# Patient Record
Sex: MALE | Race: WHITE | NOT HISPANIC OR LATINO | URBAN - METROPOLITAN AREA
[De-identification: names, ages, dates, MRNs, and addresses within clinical notes are randomized per-mention and may not be internally consistent; named-entity substitution may affect disease eponyms.]

---

## 2024-11-19 ENCOUNTER — OFFICE VISIT (OUTPATIENT)
Dept: FAMILY MEDICINE CLINIC | Facility: CLINIC | Age: 39
End: 2024-11-19
Payer: MEDICAID

## 2024-11-19 ENCOUNTER — LAB (OUTPATIENT)
Dept: LAB | Facility: HOSPITAL | Age: 39
End: 2024-11-19
Payer: MEDICAID

## 2024-11-19 VITALS
HEIGHT: 67 IN | DIASTOLIC BLOOD PRESSURE: 70 MMHG | WEIGHT: 148.4 LBS | OXYGEN SATURATION: 99 % | BODY MASS INDEX: 23.29 KG/M2 | TEMPERATURE: 98.4 F | SYSTOLIC BLOOD PRESSURE: 112 MMHG | HEART RATE: 66 BPM

## 2024-11-19 DIAGNOSIS — Z00.00 ANNUAL PHYSICAL EXAM: Primary | ICD-10-CM

## 2024-11-19 DIAGNOSIS — Z00.00 ANNUAL PHYSICAL EXAM: ICD-10-CM

## 2024-11-19 LAB
ALBUMIN SERPL-MCNC: 4.2 G/DL (ref 3.5–5.2)
ALBUMIN/GLOB SERPL: 1.5 G/DL
ALP SERPL-CCNC: 60 U/L (ref 39–117)
ALT SERPL W P-5'-P-CCNC: 17 U/L (ref 1–41)
ANION GAP SERPL CALCULATED.3IONS-SCNC: 10 MMOL/L (ref 5–15)
AST SERPL-CCNC: 20 U/L (ref 1–40)
BASOPHILS # BLD MANUAL: 0.04 10*3/MM3 (ref 0–0.2)
BASOPHILS NFR BLD MANUAL: 1 % (ref 0–1.5)
BILIRUB SERPL-MCNC: 0.6 MG/DL (ref 0–1.2)
BUN SERPL-MCNC: 16 MG/DL (ref 6–20)
BUN/CREAT SERPL: 18.2 (ref 7–25)
CALCIUM SPEC-SCNC: 9.3 MG/DL (ref 8.6–10.5)
CHLORIDE SERPL-SCNC: 104 MMOL/L (ref 98–107)
CHOLEST SERPL-MCNC: 160 MG/DL (ref 0–200)
CO2 SERPL-SCNC: 26 MMOL/L (ref 22–29)
CREAT SERPL-MCNC: 0.88 MG/DL (ref 0.76–1.27)
DEPRECATED RDW RBC AUTO: 41.2 FL (ref 37–54)
EGFRCR SERPLBLD CKD-EPI 2021: 112.9 ML/MIN/1.73
EOSINOPHIL # BLD MANUAL: 0.09 10*3/MM3 (ref 0–0.4)
EOSINOPHIL NFR BLD MANUAL: 2 % (ref 0.3–6.2)
ERYTHROCYTE [DISTWIDTH] IN BLOOD BY AUTOMATED COUNT: 12.4 % (ref 12.3–15.4)
GLOBULIN UR ELPH-MCNC: 2.8 GM/DL
GLUCOSE SERPL-MCNC: 94 MG/DL (ref 65–99)
HBA1C MFR BLD: 5.7 % (ref 4.8–5.6)
HCT VFR BLD AUTO: 43.4 % (ref 37.5–51)
HCV AB SER QL: NORMAL
HDLC SERPL-MCNC: 46 MG/DL (ref 40–60)
HGB BLD-MCNC: 14.8 G/DL (ref 13–17.7)
LDLC SERPL CALC-MCNC: 102 MG/DL (ref 0–100)
LDLC/HDLC SERPL: 2.23 {RATIO}
LYMPHOCYTES # BLD MANUAL: 2.12 10*3/MM3 (ref 0.7–3.1)
LYMPHOCYTES NFR BLD MANUAL: 6.1 % (ref 5–12)
MCH RBC QN AUTO: 30.8 PG (ref 26.6–33)
MCHC RBC AUTO-ENTMCNC: 34.1 G/DL (ref 31.5–35.7)
MCV RBC AUTO: 90.2 FL (ref 79–97)
MONOCYTES # BLD: 0.26 10*3/MM3 (ref 0.1–0.9)
NEUTROPHILS # BLD AUTO: 1.81 10*3/MM3 (ref 1.7–7)
NEUTROPHILS NFR BLD MANUAL: 41.8 % (ref 42.7–76)
NRBC BLD AUTO-RTO: 0 /100 WBC (ref 0–0.2)
PLAT MORPH BLD: NORMAL
PLATELET # BLD AUTO: 241 10*3/MM3 (ref 140–450)
PMV BLD AUTO: 10.2 FL (ref 6–12)
POTASSIUM SERPL-SCNC: 4.2 MMOL/L (ref 3.5–5.2)
PROT SERPL-MCNC: 7 G/DL (ref 6–8.5)
RBC # BLD AUTO: 4.81 10*6/MM3 (ref 4.14–5.8)
RBC MORPH BLD: NORMAL
SODIUM SERPL-SCNC: 140 MMOL/L (ref 136–145)
TRIGL SERPL-MCNC: 57 MG/DL (ref 0–150)
TSH SERPL DL<=0.05 MIU/L-ACNC: 2.25 UIU/ML (ref 0.27–4.2)
VARIANT LYMPHS NFR BLD MANUAL: 49 % (ref 19.6–45.3)
VLDLC SERPL-MCNC: 12 MG/DL (ref 5–40)
WBC MORPH BLD: NORMAL
WBC NRBC COR # BLD AUTO: 4.33 10*3/MM3 (ref 3.4–10.8)

## 2024-11-19 PROCEDURE — 1159F MED LIST DOCD IN RCRD: CPT | Performed by: STUDENT IN AN ORGANIZED HEALTH CARE EDUCATION/TRAINING PROGRAM

## 2024-11-19 PROCEDURE — 99395 PREV VISIT EST AGE 18-39: CPT | Performed by: STUDENT IN AN ORGANIZED HEALTH CARE EDUCATION/TRAINING PROGRAM

## 2024-11-19 PROCEDURE — 84443 ASSAY THYROID STIM HORMONE: CPT

## 2024-11-19 PROCEDURE — 1160F RVW MEDS BY RX/DR IN RCRD: CPT | Performed by: STUDENT IN AN ORGANIZED HEALTH CARE EDUCATION/TRAINING PROGRAM

## 2024-11-19 PROCEDURE — 80061 LIPID PANEL: CPT

## 2024-11-19 PROCEDURE — 1126F AMNT PAIN NOTED NONE PRSNT: CPT | Performed by: STUDENT IN AN ORGANIZED HEALTH CARE EDUCATION/TRAINING PROGRAM

## 2024-11-19 PROCEDURE — 80053 COMPREHEN METABOLIC PANEL: CPT

## 2024-11-19 PROCEDURE — 83036 HEMOGLOBIN GLYCOSYLATED A1C: CPT

## 2024-11-19 PROCEDURE — 85007 BL SMEAR W/DIFF WBC COUNT: CPT

## 2024-11-19 PROCEDURE — 86803 HEPATITIS C AB TEST: CPT

## 2024-11-19 PROCEDURE — 2014F MENTAL STATUS ASSESS: CPT | Performed by: STUDENT IN AN ORGANIZED HEALTH CARE EDUCATION/TRAINING PROGRAM

## 2024-11-19 PROCEDURE — 85025 COMPLETE CBC W/AUTO DIFF WBC: CPT

## 2024-11-19 NOTE — PROGRESS NOTES
"    Office Note     Name: Lam Humphreys    : 1985     MRN: [id number]     Chief Complaint  Establish Care    Subjective     History of Present Illness:  Lam Humphreys is a 38 y.o. male who presents today to establish care and get an annual physical.    He has no concerns today.  He is okay with getting basic labs today.    He was sick recently and needed to go to the urgent treatment clinic.  Overall, symptoms have improved.  He does still have    HM:   -Believes that he is up-to-date on his Tdap, had it right before arriving to the [facility].          Objective     History reviewed. No pertinent past medical history.  Past Surgical History:   Procedure Laterality Date    KNEE SURGERY Left     had a ligement repair     History reviewed. No pertinent family history.    Vital Signs  /70   Pulse 66   Temp 98.4 °F (36.9 °C) (Infrared)   Ht 170.2 cm (67\")   Wt 67.3 kg (148 lb 6.4 oz)   SpO2 99%   BMI 23.24 kg/m²   Estimated body mass index is 23.24 kg/m² as calculated from the following:    Height as of this encounter: 170.2 cm (67\").    Weight as of this encounter: 67.3 kg (148 lb 6.4 oz).    Physical Exam  Vitals reviewed.   Constitutional:       Appearance: Normal appearance.   HENT:      Head: Normocephalic.      Right Ear: External ear normal.      Left Ear: External ear normal.      Nose: Nose normal.      Mouth/Throat:      Mouth: Mucous membranes are moist.   Eyes:      Extraocular Movements: Extraocular movements intact.   Neck:      Comments: Enlarged lymph node on the left  Cardiovascular:      Rate and Rhythm: Normal rate and regular rhythm.      Heart sounds: Normal heart sounds.   Pulmonary:      Effort: Pulmonary effort is normal. No respiratory distress.      Breath sounds: Normal breath sounds.   Abdominal:      General: Abdomen is flat.      Palpations: Abdomen is soft.   Musculoskeletal:      Cervical back: No rigidity.      Comments: Moving all extremities spontaneously "   Skin:     General: Skin is warm and dry.   Neurological:      General: No focal deficit present.      Mental Status: He is alert and oriented to person, place, and time.   Psychiatric:         Mood and Affect: Mood normal.         Behavior: Behavior normal.                 Assessment and Plan     Diagnoses and all orders for this visit:    1. Annual physical exam (Primary)  Assessment & Plan:  - Recommend diet and exercise: At least 30 minutes of exercises per week and eating a variety of fresh fruits, vegetables, lean proteins limiting fatty food intake and carbohydrate intake  - Recommend annual dental and eye visits-additional information provided in AVS  - Obtain basic screening labs, will follow-up on results and advise further  - Patient believes that he is up-to-date on his Tdap vaccine, reports that he had a right before arriving to the [place], so within the last several months    Orders:  -     Lipid Panel; Future  -     TSH Rfx On Abnormal To Free T4; Future  -     Hepatitis C Antibody; Future  -     Hemoglobin A1c; Future  -     Comprehensive Metabolic Panel; Future  -     CBC & Differential; Future      BMI is within normal parameters. No other follow-up for BMI required.    Follow Up  No follow-ups on file.    Zoila Ding MD

## 2024-11-19 NOTE — PATIENT INSTRUCTIONS

## 2024-11-19 NOTE — ASSESSMENT & PLAN NOTE
- Recommend diet and exercise: At least 30 minutes of exercises per week and eating a variety of fresh fruits, vegetables, lean proteins limiting fatty food intake and carbohydrate intake  - Recommend annual dental and eye visits-additional information provided in AVS  - Obtain basic screening labs, will follow-up on results and advise further  - Patient believes that he is up-to-date on his Tdap vaccine, reports that he had a right before arriving to the [place], so within the last several months

## 2025-04-24 ENCOUNTER — OFFICE VISIT (OUTPATIENT)
Dept: FAMILY MEDICINE CLINIC | Facility: CLINIC | Age: 40
End: 2025-04-24
Payer: MEDICAID

## 2025-04-24 VITALS
TEMPERATURE: 98.4 F | OXYGEN SATURATION: 98 % | DIASTOLIC BLOOD PRESSURE: 62 MMHG | WEIGHT: 145 LBS | BODY MASS INDEX: 22.76 KG/M2 | HEIGHT: 67 IN | HEART RATE: 74 BPM | SYSTOLIC BLOOD PRESSURE: 108 MMHG

## 2025-04-24 DIAGNOSIS — J06.9 VIRAL UPPER RESPIRATORY INFECTION: Primary | ICD-10-CM

## 2025-04-24 DIAGNOSIS — R05.9 COUGH, UNSPECIFIED TYPE: ICD-10-CM

## 2025-04-24 DIAGNOSIS — R10.9 BILATERAL FLANK PAIN: ICD-10-CM

## 2025-04-24 LAB
BILIRUB BLD-MCNC: NEGATIVE MG/DL
CLARITY, POC: CLEAR
COLOR UR: YELLOW
EXPIRATION DATE: NORMAL
EXPIRATION DATE: NORMAL
FLUAV AG UPPER RESP QL IA.RAPID: NOT DETECTED
FLUBV AG UPPER RESP QL IA.RAPID: NOT DETECTED
GLUCOSE UR STRIP-MCNC: NEGATIVE MG/DL
INTERNAL CONTROL: NORMAL
KETONES UR QL: NEGATIVE
LEUKOCYTE EST, POC: NEGATIVE
Lab: NORMAL
Lab: NORMAL
NITRITE UR-MCNC: NEGATIVE MG/ML
PH UR: 7 [PH] (ref 5–8)
PROT UR STRIP-MCNC: NEGATIVE MG/DL
RBC # UR STRIP: NEGATIVE /UL
SARS-COV-2 AG UPPER RESP QL IA.RAPID: NOT DETECTED
SP GR UR: 1.01 (ref 1–1.03)
UROBILINOGEN UR QL: NORMAL

## 2025-04-24 PROCEDURE — 87086 URINE CULTURE/COLONY COUNT: CPT | Performed by: STUDENT IN AN ORGANIZED HEALTH CARE EDUCATION/TRAINING PROGRAM

## 2025-04-24 RX ORDER — FLUTICASONE PROPIONATE 50 MCG
2 SPRAY, SUSPENSION (ML) NASAL DAILY
COMMUNITY

## 2025-04-24 RX ORDER — AZITHROMYCIN 500 MG/1
500 TABLET, FILM COATED ORAL DAILY
Qty: 3 TABLET | Refills: 0 | Status: SHIPPED | OUTPATIENT
Start: 2025-04-24 | End: 2025-04-27

## 2025-04-24 RX ORDER — BROMPHENIRAMINE MALEATE, PSEUDOEPHEDRINE HYDROCHLORIDE, AND DEXTROMETHORPHAN HYDROBROMIDE 2; 30; 10 MG/5ML; MG/5ML; MG/5ML
5 SYRUP ORAL 4 TIMES DAILY PRN
Qty: 118 ML | Refills: 0 | Status: SHIPPED | OUTPATIENT
Start: 2025-04-24

## 2025-04-24 NOTE — PROGRESS NOTES
"    Office Note     Name: Lam Humphreys    : 1985     MRN: [id number]     Chief Complaint  Fever (Monday patient had a fever), Cough, and Back Pain    Subjective     History of Present Illness:  Lam Humphreys is a 39 y.o. male who presents today for fever, cough and back pain.    Patient reports that he has developed symptoms 6 days ago.  He did have a fever on Monday but no longer has a fever.  He remains congested and has been coughing.  He does feel that his symptoms overall have been getting better.  He is using some nasal spray from [employer].  His cough is very irritating and he can have coughing spells that can either be dry or productive.    Patient reports that he has had bilateral flank pain that started about 4 days ago.  He reports that this gets worse when he sits for prolonged period of time.  Denies any history of nephrolithiasis.  No radiation of symptoms.  He does have a history of prostatitis and is worried that that might be coming back.  He cannot really tell me the quality of his pain.        Objective     History reviewed. No pertinent past medical history.  Past Surgical History:   Procedure Laterality Date    KNEE SURGERY Left     had a ligement repair     History reviewed. No pertinent family history.    Vital Signs  /62   Pulse 74   Temp 98.4 °F (36.9 °C) (Infrared)   Ht 170.2 cm (67\")   Wt 65.8 kg (145 lb)   SpO2 98%   BMI 22.71 kg/m²   Estimated body mass index is 22.71 kg/m² as calculated from the following:    Height as of this encounter: 170.2 cm (67\").    Weight as of this encounter: 65.8 kg (145 lb).    Physical Exam  Vitals reviewed.   Constitutional:       Appearance: Normal appearance.   HENT:      Head: Normocephalic.      Right Ear: Tympanic membrane normal.      Left Ear: Tympanic membrane normal.      Nose: Congestion present.      Mouth/Throat:      Mouth: Mucous membranes are moist.   Eyes:      Conjunctiva/sclera: Conjunctivae normal.   Cardiovascular: "      Rate and Rhythm: Normal rate and regular rhythm.   Pulmonary:      Effort: Pulmonary effort is normal.      Comments: Possibly some crackles in the right upper lung lobe  Abdominal:      General: Abdomen is flat.      Palpations: Abdomen is soft.      Comments: No CVA tenderness   Musculoskeletal:      Comments: No tenderness to palpation over lumbar spine   Neurological:      Mental Status: He is alert.               Assessment and Plan     Diagnoses and all orders for this visit:    1. Viral upper respiratory infection (Primary)  -     Covid-19 + Flu A&B AG, Veritor  -     azithromycin (Zithromax) 500 MG tablet; Take 1 tablet by mouth Daily for 3 days.  Dispense: 3 tablet; Refill: 0  -     brompheniramine-pseudoephedrine-DM 30-2-10 MG/5ML syrup; Take 5 mL by mouth 4 (Four) Times a Day As Needed for Cough or Congestion.  Dispense: 118 mL; Refill: 0    2. Cough, unspecified type  -     Covid-19 + Flu A&B AG, Veritor  -     XR Chest PA & Lateral (In Office)  -     azithromycin (Zithromax) 500 MG tablet; Take 1 tablet by mouth Daily for 3 days.  Dispense: 3 tablet; Refill: 0  -     brompheniramine-pseudoephedrine-DM 30-2-10 MG/5ML syrup; Take 5 mL by mouth 4 (Four) Times a Day As Needed for Cough or Congestion.  Dispense: 118 mL; Refill: 0    3. Bilateral flank pain  -     POCT urinalysis dipstick, automated  -     Urine Culture - Urine, Urine, Clean Catch; Future  -     Urine Culture - Urine, Urine, Clean Catch    Patient likely with a viral upper respiratory infection.  Had negative COVID and flu today.  Suspect that has developed pneumonia, has had some crackles in the right upper lobe pending x-ray for visualization of this.  Will start azithromycin and may add Augmentin depending on x-ray results.  Will start Bromfed to use as needed for cough and congestion.    Patient reports bilateral flank pain, point-of-care UA negative for infection, patient is worried about prostatitis, so obtaining urine culture,  will follow-up on those results and advise further.  I do think the flank pain is likely musculoskeletal in origin and I did discuss stretching exercises.  Can use Tylenol and ibuprofen for pain control as needed.    Advised to return to clinic if no improvement or worsening of symptoms.  Follow Up  No follow-ups on file.    Zoila Ding MD

## 2025-04-25 LAB — BACTERIA SPEC AEROBE CULT: NO GROWTH

## 2025-05-24 ENCOUNTER — HOSPITAL ENCOUNTER (EMERGENCY)
Facility: HOSPITAL | Age: 40
Discharge: HOME | End: 2025-05-24
Attending: EMERGENCY MEDICINE
Payer: MEDICAID

## 2025-05-24 VITALS
BODY MASS INDEX: 22.77 KG/M2 | HEIGHT: 67 IN | DIASTOLIC BLOOD PRESSURE: 93 MMHG | WEIGHT: 145.06 LBS | TEMPERATURE: 97.9 F | HEART RATE: 60 BPM | SYSTOLIC BLOOD PRESSURE: 134 MMHG | OXYGEN SATURATION: 100 % | RESPIRATION RATE: 16 BRPM

## 2025-05-24 DIAGNOSIS — S71.152A DOG BITE OF LEFT THIGH, INITIAL ENCOUNTER: ICD-10-CM
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
## 2025-05-24 DIAGNOSIS — S61.052A: Primary | ICD-10-CM

## 2025-05-24 DIAGNOSIS — S81.852A DOG BITE OF LEFT LOWER LEG, INITIAL ENCOUNTER: ICD-10-CM
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
## 2025-05-24 DIAGNOSIS — S31.159A DOG BITE OF ABDOMEN: ICD-10-CM
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
## 2025-05-24 PROCEDURE — 90715 TDAP VACCINE 7 YRS/> IM: CPT | Performed by: PHYSICIAN ASSISTANT

## 2025-05-24 PROCEDURE — 90471 IMMUNIZATION ADMIN: CPT | Performed by: PHYSICIAN ASSISTANT

## 2025-05-24 PROCEDURE — 99283 EMERGENCY DEPT VISIT LOW MDM: CPT

## 2025-05-24 PROCEDURE — 25010000002 TETANUS-DIPHTH-ACELL PERTUSSIS 5-2.5-18.5 LF-MCG/0.5 SUSPENSION PREFILLED SYRINGE: Performed by: PHYSICIAN ASSISTANT

## 2025-05-24 RX ADMIN — TETANUS TOXOID, REDUCED DIPHTHERIA TOXOID AND ACELLULAR PERTUSSIS VACCINE, ADSORBED 0.5 ML: 5; 2.5; 8; 8; 2.5 SUSPENSION INTRAMUSCULAR at 22:48

## 2025-05-24 RX ADMIN — AMOXICILLIN AND CLAVULANATE POTASSIUM 1 TABLET: 875; 125 TABLET, FILM COATED ORAL at 22:48

## 2025-05-25 NOTE — ED PROVIDER NOTES
EMERGENCY DEPARTMENT ENCOUNTER    Pt Name: Lam Humphreys  MRN: [id number]  Pt :   1985  Room Number:    Date of encounter:  2025  PCP: Zoila Ding MD  ED Provider: LATASHA Thrasher    Historian: Patient    HPI:  Chief Complaint: Dog bites     Context: Lam Humphreys is a 39 y.o. 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 The dog was inside the house and got outside when the patient entered to deliver groceries. The incident occurred today. Patient has puncture wounds and superficial lacerations to left thumb, left flank, left lower leg and left posterior thigh. Unknown tetanus status.     HPI     REVIEW OF SYSTEMS  A chief complaint appropriate review of systems was completed and is negative except as noted in the HPI.     PAST MEDICAL HISTORY  No past medical history on file.    PAST SURGICAL HISTORY  Past Surgical History:   Procedure Laterality Date    KNEE SURGERY Left     had a ligement repair       FAMILY HISTORY  No family history on file.    SOCIAL HISTORY  Social History     Socioeconomic History    Marital status:    Tobacco Use    Smoking status: Never    Smokeless tobacco: Never   Vaping Use    Vaping status: Never Used   Substance and Sexual Activity    Alcohol use: Never    Drug use: Never    Sexual activity: Yes     Partners: Female       ALLERGIES  Patient has no known allergies.    PHYSICAL EXAM  Physical Exam  Vitals and nursing note reviewed.   Constitutional:       General: He is not in acute distress.     Appearance: Normal appearance. He is not ill-appearing or toxic-appearing.   HENT:      Head: Normocephalic and atraumatic.      Nose: Nose normal.      Mouth/Throat:      Mouth: Mucous membranes are moist.   Eyes:      Extraocular Movements: Extraocular movements intact.   Cardiovascular:      Rate and Rhythm: Normal rate.   Pulmonary:       Effort: Pulmonary effort is normal.   Musculoskeletal:         General: Normal range of motion.      Cervical back: Normal range of motion and neck supple.   Skin:     General: Skin is warm and dry.      Comments: Dog bite wounds left side, left thumb, left anterior shin and left posterior thigh. See clinical photograph uploaded below for additional documentation. Left thumb Neurovascularly intact, brisk capillary refill, positive range of motion, able to flex and extend thumb against resistance.    Neurological:      General: No focal deficit present.      Mental Status: He is alert.   Psychiatric:         Mood and Affect: Mood normal.         Behavior: Behavior normal.                         LAB RESULTS    If labs were ordered, I independently reviewed the results and considered them in treating the patient.    RADIOLOGY  No orders to display     [] Radiologist's Report Reviewed:  I ordered and independently interpreted the above noted radiographic studies.  See radiologist's dictation for official interpretation.      PROCEDURES    Procedures    No orders to display       MEDICATIONS GIVEN IN ER    Medications   amoxicillin-clavulanate (AUGMENTIN) 875-125 MG per tablet 1 tablet (1 tablet Oral Given 5/24/25 2248)   Tetanus-Diphth-Acell Pertussis (BOOSTRIX) injection 0.5 mL (0.5 mL Intramuscular Given 5/24/25 2248)       MEDICAL DECISION MAKING, PROGRESS, and CONSULTS   Medical Decision Making  39-year-old nontoxic-appearing male with doge bite wounds to the left flank, left thumb, left thigh and left lower leg with history and exam consistent with moderate risk for infection. Initial consideration in this patient included risk for deep space infection, tendon injury, risk for rabies, risk for tetanus, and penetration into joint cavity amongst others. Patient presented with lacerations as documented without eviddence of significant contamination to the wounds.  Patient noted to have full active and passive range  of motion of the left thumb.  Patient noted to have no evidence of tendon injury.  All wounds cleaned and dressed in the ED prior to discharge. Tetanus status was unknown with patient vaccinated in the ED prior to discharge.  Risk of rabies was considered unlikely given report of domesticated animal with no evidence of unusual or aggressive behavior and able to be quarantined.  Administration of rabies immunoglobulin and vaccine felt to not be indicated at this time. Discussed discharge with prophylactic antibiotics, and follow up with primary care and occupational Health. Patient demonstrated understanding and agreement. Return precautions provided.        Problems Addressed:  Dog bite of abdomen: complicated acute illness or injury  Dog bite of left lower leg, initial encounter: complicated acute illness or injury  Dog bite of left thigh, initial encounter: complicated acute illness or injury  Open wound of left thumb due to dog bite: complicated acute illness or injury    Risk  Prescription drug management.      Discussion below represents my analysis of pertinent findings related to patient's condition, differential diagnosis, treatment plan and final disposition.    Assessment includes with Differential diagnosis including but is not limited to:   Tendon/ligament laceration, neurovascular compromise, retained foreign body, or superimposed infection.    Additional sources  Discussed/ obtained information from independent historians:   [] Spouse  [] Parent  [] Family member  [] Friend  [] EMS   [] Other:  External (non-ED) record review:   [] Inpatient record:   [] Office record:   [] Outpatient record:   [] Prior Outpatient labs:   [] Prior Outpatient radiology:   [] Primary Care record:   [] Outside ED record:   [] Other:   Patient's care impacted by:   [] Diabetes  [] Hypertension  [] Hyperlipidemia  [] Hypothyroidism   [] Coronary Artery Disease  [] Congestive Heart Failure   [] COPD   [] Cancer   []  Obesity  [] GERD   [] Tobacco Abuse   [] Substance Abuse    [] Anxiety   [] Depression   [] Other:   Care significantly affected by Social Determinants of Health (housing and economic circumstances, unemployment)    [] Yes     [x] No   If yes, Patient's care significantly limited by  Social Determinants of Health including:   [] Inadequate housing   [] Low income   [] Alcoholism and drug addiction in family   [] Problems related to primary support group   [] Unemployment   [] Problems related to employment   [] Other Social Determinants of Health:   I considered prescription management  with:   [] Pain medication  [] Antiviral  [x] Antibiotic: Implemented as prescribed for bite wound prophylaxis    [] Other:   Rationale:    ED Course:    ED Course as of 05/24/25 2325   Sat May 24, 2025   2211 Vitals and Telemetry tracing was reviewed and directly interpreted by myself demonstrating blood pressure 134/93, temperature 97.9 °F, heart rate of 60, respirations 16 breaths/min and oxygen saturation 100% on room air [JG]   2212 BP: 134/93 [JG]   2212 Temp: 97.9 °F (36.6 °C) [JG]   2212 Heart Rate: 60 [JG]   2212 Resp: 16 [JG]   2212 SpO2: 100 % [JG]   2245 Attack by domesticated dog was provoked. Dog is able to be confined and observed for 10 days and therefore per guidelines decisions about rabies prophylaxis can be delayed. Dog bite form completed and sent to Health Department. Patent declines rabies prophylaxis at this time. [JG]      ED Course User Index  [JG] Rob Henderson, PA          DIAGNOSIS  Final diagnoses:   Open wound of left thumb due to dog bite   Dog bite of abdomen   Dog bite of left lower leg, initial encounter   Dog bite of left thigh, initial encounter     DISPOSITION    ED Disposition       ED Disposition   Discharge    Condition   Stable    Comment   --           DISCHARGE    Patient discharged in stable condition.    Reviewed implications of results, diagnosis, meds, responsibility to follow up,  warning signs and symptoms of possible worsening, potential complications and reasons to return to ER.    Patient/Family voiced understanding of above instructions.    Discussed plan for discharge, as there is no emergent indication for admission.  Pt/family is agreeable and understands need for follow up and possible repeat testing.  Pt/family is aware that discharge does not mean that nothing is wrong but that it indicates no emergency is currently present that requires admission and they must continue care with follow-up as given below or with a physician of their choice.     FOLLOW-UP  Zoila Ding MD  [address] [facility]  [address]  [place] [id number] [phone]    Call   For wound re-check    [facility] EMERGENCY DEPARTMENT  [address] [address] [phone]  Go to   If symptoms worsen         Medication List        New Prescriptions      amoxicillin-clavulanate 875-125 MG per tablet  Commonly known as: AUGMENTIN  Take 1 tablet by mouth 2 (Two) Times a Day for 7 days.               Where to Get Your Medications        These medications were sent to [facility] [id number] - [place], [place] - [address] AT [facility] [address] - [phone]  - [phone] [place] [address], [facility] [address]      Phone: [phone]   amoxicillin-clavulanate 875-125 MG per tablet           Rob Henderson PA  05/24/25 6055

## 2025-05-28 ENCOUNTER — OFFICE VISIT (OUTPATIENT)
Dept: FAMILY MEDICINE CLINIC | Facility: CLINIC | Age: 40
End: 2025-05-28
Payer: MEDICAID

## 2025-05-28 VITALS
HEIGHT: 67 IN | BODY MASS INDEX: 23.48 KG/M2 | TEMPERATURE: 98.4 F | OXYGEN SATURATION: 99 % | WEIGHT: 149.6 LBS | SYSTOLIC BLOOD PRESSURE: 108 MMHG | HEART RATE: 67 BPM | DIASTOLIC BLOOD PRESSURE: 54 MMHG
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
## 2025-05-28 DIAGNOSIS — R05.9 COUGH, UNSPECIFIED TYPE: Primary | ICD-10-CM

## 2025-05-28 RX ORDER — CETIRIZINE HYDROCHLORIDE 10 MG/1
10 TABLET ORAL DAILY
Qty: 30 TABLET | Refills: 1 | Status: SHIPPED | OUTPATIENT
Start: 2025-05-28

## 2025-05-28 RX ORDER — FLUTICASONE PROPIONATE 50 MCG
2 SPRAY, SUSPENSION (ML) NASAL DAILY
Qty: 16 G | Refills: 2 | Status: SHIPPED | OUTPATIENT
Start: 2025-05-28

## 2025-05-28 NOTE — ASSESSMENT & PLAN NOTE
- Patient with a cough for approximately 5 weeks, he did get better and then got worse again, he may have had another viral upper respiratory infection in this timeframe because he did develop a fever and his youngest child was ill, so this could be postviral cough versus postnasal drip  - Obtaining chest x-ray to ensure that previous pneumonia has resolved  - Recommend starting cetirizine and Flonase  - Return to clinic if no improvement or worsening of symptoms

## 2025-05-28 NOTE — PROGRESS NOTES
"    Office Note     Name: Lam Humphreys    : 1985     MRN: [id number]     Chief Complaint  Cough and Animal Bite (Dog bite left thigh, abdomen and finger )    Subjective     History of Present Illness:  Lam Humphreys is a 39 y.o. male who presents today for cough and animal bite.    Patient was bitten by a dog on 2025 seen in the ER that same day.  Patient was started on Augmentin.  Tdap vaccine was administered that day.  He was bitten on his left flank, his left thumb and right leg.  The dog was vaccinated.  Overall, wounds are healing.    Patient reports that he has had a cough for about 5 weeks now.  He was treated with Augmentin and azithromycin previously for pneumonia.  He does report that the cough improved but it lingered.  It is worse at night when he lays down and in the morning as soon as he wakes up.  He does have congestion and sneezing.  His child, who attends , was sick recently.  He did have a fever a couple of weeks ago.         Objective     History reviewed. No pertinent past medical history.  Past Surgical History:   Procedure Laterality Date    KNEE SURGERY Left     had a ligement repair     History reviewed. No pertinent family history.    Vital Signs  /54   Pulse 67   Temp 98.4 °F (36.9 °C) (Infrared)   Ht 170.2 cm (67\")   Wt 67.9 kg (149 lb 9.6 oz)   SpO2 99%   BMI 23.43 kg/m²   Estimated body mass index is 23.43 kg/m² as calculated from the following:    Height as of this encounter: 170.2 cm (67\").    Weight as of this encounter: 67.9 kg (149 lb 9.6 oz).    Physical Exam  Vitals reviewed.   Constitutional:       Appearance: Normal appearance.   HENT:      Head: Normocephalic.      Right Ear: Tympanic membrane normal.      Left Ear: Tympanic membrane normal.      Nose: Congestion present.      Comments: Swollen nasal turbinates     Mouth/Throat:      Mouth: Mucous membranes are moist.   Eyes:      Conjunctiva/sclera: Conjunctivae normal.   Cardiovascular: "      Rate and Rhythm: Normal rate and regular rhythm.   Pulmonary:      Effort: Pulmonary effort is normal.      Breath sounds: Normal breath sounds.   Neurological:      Mental Status: He is alert.               Assessment and Plan     Diagnoses and all orders for this visit:    1. Cough, unspecified type (Primary)  Assessment & Plan:  - Patient with a cough for approximately 5 weeks, he did get better and then got worse again, he may have had another viral upper respiratory infection in this timeframe because he did develop a fever and his youngest child was ill, so this could be postviral cough versus postnasal drip  - Obtaining chest x-ray to ensure that previous pneumonia has resolved  - Recommend starting cetirizine and Flonase  - Return to clinic if no improvement or worsening of symptoms    Orders:  -     XR Chest PA & Lateral; Future  -     cetirizine (zyrTEC) 10 MG tablet; Take 1 tablet by mouth Daily.  Dispense: 30 tablet; Refill: 1  -     fluticasone (FLONASE) 50 MCG/ACT nasal spray; Administer 2 sprays into the nostril(s) as directed by provider Daily.  Dispense: 16 g; Refill: 2    2. Dog bite, initial encounter  Assessment & Plan:  - Wounds are overall healing  - Continue Augmentin, patient on day 4 of 7        BMI is within normal parameters. No other follow-up for BMI required.    Follow Up  No follow-ups on file.    Zoila Ding MD